# Patient Record
Sex: MALE | Race: WHITE
[De-identification: names, ages, dates, MRNs, and addresses within clinical notes are randomized per-mention and may not be internally consistent; named-entity substitution may affect disease eponyms.]

---

## 2020-02-04 ENCOUNTER — HOSPITAL ENCOUNTER (EMERGENCY)
Dept: HOSPITAL 54 - ER | Age: 80
Discharge: HOME | End: 2020-02-04
Payer: MEDICARE

## 2020-02-04 VITALS — DIASTOLIC BLOOD PRESSURE: 76 MMHG | SYSTOLIC BLOOD PRESSURE: 130 MMHG

## 2020-02-04 VITALS — WEIGHT: 150 LBS | HEIGHT: 65 IN | BODY MASS INDEX: 24.99 KG/M2

## 2020-02-04 DIAGNOSIS — G89.4: ICD-10-CM

## 2020-02-04 DIAGNOSIS — Z79.899: ICD-10-CM

## 2020-02-04 DIAGNOSIS — Z88.0: ICD-10-CM

## 2020-02-04 DIAGNOSIS — N18.6: ICD-10-CM

## 2020-02-04 DIAGNOSIS — I48.91: ICD-10-CM

## 2020-02-04 DIAGNOSIS — Z93.1: ICD-10-CM

## 2020-02-04 DIAGNOSIS — L89.154: ICD-10-CM

## 2020-02-04 DIAGNOSIS — E11.22: ICD-10-CM

## 2020-02-04 DIAGNOSIS — Z98.890: ICD-10-CM

## 2020-02-04 DIAGNOSIS — Z99.2: ICD-10-CM

## 2020-02-04 DIAGNOSIS — I12.0: Primary | ICD-10-CM

## 2020-02-04 NOTE — NUR
BIBRA FORR EVALUATION OF LOW BP S/P HD. PT A, OX3 TRACH IN PLACE , VENT 
DEPENDENT, GT IN PLACE, LCW HD CATH INTACT.

## 2020-03-28 ENCOUNTER — HOSPITAL ENCOUNTER (EMERGENCY)
Dept: HOSPITAL 54 - ER | Age: 80
Discharge: HOME | End: 2020-03-28
Payer: MEDICARE

## 2020-03-28 VITALS — SYSTOLIC BLOOD PRESSURE: 98 MMHG | DIASTOLIC BLOOD PRESSURE: 56 MMHG

## 2020-03-28 VITALS — BODY MASS INDEX: 31.34 KG/M2 | WEIGHT: 195 LBS | HEIGHT: 66 IN

## 2020-03-28 DIAGNOSIS — I12.0: ICD-10-CM

## 2020-03-28 DIAGNOSIS — N18.6: ICD-10-CM

## 2020-03-28 DIAGNOSIS — Z99.2: ICD-10-CM

## 2020-03-28 DIAGNOSIS — Z79.899: ICD-10-CM

## 2020-03-28 DIAGNOSIS — Z88.0: ICD-10-CM

## 2020-03-28 DIAGNOSIS — I48.91: ICD-10-CM

## 2020-03-28 DIAGNOSIS — E11.22: Primary | ICD-10-CM

## 2020-03-28 DIAGNOSIS — E11.40: ICD-10-CM

## 2020-03-28 DIAGNOSIS — Z98.890: ICD-10-CM

## 2020-03-28 NOTE — NUR
mse performed by dr. krueger, pt may go back to facility, called for report, 
[facility] crew to transfer back.

## 2020-03-28 NOTE — NUR
RT NOTE

Late Entry: Pt rec'd trached on [facility] vent on noted settings given from transport RT. Pt 
shows no signs of resp distress or SOB. Trach is patent and secured. Sx'd for thick mod amt 
of pale yellow secretions. Alarms are set and audible. Vent plugged into red outlet. Ambu 
bag bedside. Will continue to monitor closely.

-------------------------------------------------------------------------------

Addendum: 03/28/20 at 2027 by MAMI ROBERTSON RT

-------------------------------------------------------------------------------

Amended: Links added.